# Patient Record
Sex: FEMALE | Race: OTHER | HISPANIC OR LATINO | URBAN - METROPOLITAN AREA
[De-identification: names, ages, dates, MRNs, and addresses within clinical notes are randomized per-mention and may not be internally consistent; named-entity substitution may affect disease eponyms.]

---

## 2017-08-03 ENCOUNTER — INPATIENT (INPATIENT)
Facility: HOSPITAL | Age: 42
LOS: 0 days | Discharge: HOME | DRG: 340 | End: 2017-08-04
Attending: SPECIALIST | Admitting: SPECIALIST
Payer: MEDICAID

## 2017-08-03 VITALS
SYSTOLIC BLOOD PRESSURE: 95 MMHG | OXYGEN SATURATION: 99 % | RESPIRATION RATE: 20 BRPM | HEART RATE: 51 BPM | WEIGHT: 149.03 LBS | TEMPERATURE: 98 F | DIASTOLIC BLOOD PRESSURE: 54 MMHG

## 2017-08-03 DIAGNOSIS — K37 UNSPECIFIED APPENDICITIS: ICD-10-CM

## 2017-08-03 DIAGNOSIS — K35.3 ACUTE APPENDICITIS WITH LOCALIZED PERITONITIS: ICD-10-CM

## 2017-08-03 DIAGNOSIS — Z98.51 TUBAL LIGATION STATUS: Chronic | ICD-10-CM

## 2017-08-03 LAB
ALBUMIN SERPL ELPH-MCNC: 3.5 G/DL — SIGNIFICANT CHANGE UP (ref 3.5–5)
ALP SERPL-CCNC: 66 U/L — SIGNIFICANT CHANGE UP (ref 40–120)
ALT FLD-CCNC: 21 U/L DA — SIGNIFICANT CHANGE UP (ref 10–60)
ANION GAP SERPL CALC-SCNC: 3 MMOL/L — LOW (ref 5–17)
APPEARANCE UR: CLEAR — SIGNIFICANT CHANGE UP
APTT BLD: 37.5 SEC — HIGH (ref 27.5–37.4)
AST SERPL-CCNC: 13 U/L — SIGNIFICANT CHANGE UP (ref 10–40)
BASOPHILS # BLD AUTO: 0.1 K/UL — SIGNIFICANT CHANGE UP (ref 0–0.2)
BASOPHILS NFR BLD AUTO: 1.2 % — SIGNIFICANT CHANGE UP (ref 0–2)
BILIRUB SERPL-MCNC: 0.2 MG/DL — SIGNIFICANT CHANGE UP (ref 0.2–1.2)
BILIRUB UR-MCNC: NEGATIVE — SIGNIFICANT CHANGE UP
BUN SERPL-MCNC: 10 MG/DL — SIGNIFICANT CHANGE UP (ref 7–18)
CALCIUM SERPL-MCNC: 8 MG/DL — LOW (ref 8.4–10.5)
CHLORIDE SERPL-SCNC: 109 MMOL/L — HIGH (ref 96–108)
CO2 SERPL-SCNC: 29 MMOL/L — SIGNIFICANT CHANGE UP (ref 22–31)
COLOR SPEC: YELLOW — SIGNIFICANT CHANGE UP
CREAT SERPL-MCNC: 0.66 MG/DL — SIGNIFICANT CHANGE UP (ref 0.5–1.3)
DIFF PNL FLD: ABNORMAL
EOSINOPHIL # BLD AUTO: 0.1 K/UL — SIGNIFICANT CHANGE UP (ref 0–0.5)
EOSINOPHIL NFR BLD AUTO: 1.7 % — SIGNIFICANT CHANGE UP (ref 0–6)
GLUCOSE SERPL-MCNC: 86 MG/DL — SIGNIFICANT CHANGE UP (ref 70–99)
GLUCOSE UR QL: NEGATIVE — SIGNIFICANT CHANGE UP
HCG UR QL: NEGATIVE — SIGNIFICANT CHANGE UP
HCT VFR BLD CALC: 34.8 % — SIGNIFICANT CHANGE UP (ref 34.5–45)
HGB BLD-MCNC: 11.7 G/DL — SIGNIFICANT CHANGE UP (ref 11.5–15.5)
INR BLD: 1.01 RATIO — SIGNIFICANT CHANGE UP (ref 0.88–1.16)
KETONES UR-MCNC: NEGATIVE — SIGNIFICANT CHANGE UP
LEUKOCYTE ESTERASE UR-ACNC: NEGATIVE — SIGNIFICANT CHANGE UP
LIDOCAIN IGE QN: 96 U/L — SIGNIFICANT CHANGE UP (ref 73–393)
LYMPHOCYTES # BLD AUTO: 2.3 K/UL — SIGNIFICANT CHANGE UP (ref 1–3.3)
LYMPHOCYTES # BLD AUTO: 37 % — SIGNIFICANT CHANGE UP (ref 13–44)
MCHC RBC-ENTMCNC: 30.5 PG — SIGNIFICANT CHANGE UP (ref 27–34)
MCHC RBC-ENTMCNC: 33.7 GM/DL — SIGNIFICANT CHANGE UP (ref 32–36)
MCV RBC AUTO: 90.4 FL — SIGNIFICANT CHANGE UP (ref 80–100)
MONOCYTES # BLD AUTO: 0.4 K/UL — SIGNIFICANT CHANGE UP (ref 0–0.9)
MONOCYTES NFR BLD AUTO: 6.2 % — SIGNIFICANT CHANGE UP (ref 2–14)
NEUTROPHILS # BLD AUTO: 3.4 K/UL — SIGNIFICANT CHANGE UP (ref 1.8–7.4)
NEUTROPHILS NFR BLD AUTO: 53.8 % — SIGNIFICANT CHANGE UP (ref 43–77)
NITRITE UR-MCNC: NEGATIVE — SIGNIFICANT CHANGE UP
PH UR: 7 — SIGNIFICANT CHANGE UP (ref 5–8)
PLATELET # BLD AUTO: 177 K/UL — SIGNIFICANT CHANGE UP (ref 150–400)
POTASSIUM SERPL-MCNC: 3.9 MMOL/L — SIGNIFICANT CHANGE UP (ref 3.5–5.3)
POTASSIUM SERPL-SCNC: 3.9 MMOL/L — SIGNIFICANT CHANGE UP (ref 3.5–5.3)
PROT SERPL-MCNC: 6.9 G/DL — SIGNIFICANT CHANGE UP (ref 6–8.3)
PROT UR-MCNC: NEGATIVE — SIGNIFICANT CHANGE UP
PROTHROM AB SERPL-ACNC: 11 SEC — SIGNIFICANT CHANGE UP (ref 9.8–12.7)
RBC # BLD: 3.85 M/UL — SIGNIFICANT CHANGE UP (ref 3.8–5.2)
RBC # FLD: 11.3 % — SIGNIFICANT CHANGE UP (ref 10.3–14.5)
SODIUM SERPL-SCNC: 141 MMOL/L — SIGNIFICANT CHANGE UP (ref 135–145)
SP GR SPEC: 1.01 — SIGNIFICANT CHANGE UP (ref 1.01–1.02)
UROBILINOGEN FLD QL: NEGATIVE — SIGNIFICANT CHANGE UP
WBC # BLD: 6.3 K/UL — SIGNIFICANT CHANGE UP (ref 3.8–10.5)
WBC # FLD AUTO: 6.3 K/UL — SIGNIFICANT CHANGE UP (ref 3.8–10.5)

## 2017-08-03 PROCEDURE — 99285 EMERGENCY DEPT VISIT HI MDM: CPT

## 2017-08-03 PROCEDURE — 44970 LAPAROSCOPY APPENDECTOMY: CPT | Mod: AS

## 2017-08-03 PROCEDURE — 88304 TISSUE EXAM BY PATHOLOGIST: CPT | Mod: 26

## 2017-08-03 PROCEDURE — 74177 CT ABD & PELVIS W/CONTRAST: CPT | Mod: 26

## 2017-08-03 RX ORDER — HEPARIN SODIUM 5000 [USP'U]/ML
5000 INJECTION INTRAVENOUS; SUBCUTANEOUS EVERY 8 HOURS
Qty: 0 | Refills: 0 | Status: DISCONTINUED | OUTPATIENT
Start: 2017-08-03 | End: 2017-08-04

## 2017-08-03 RX ORDER — SODIUM CHLORIDE 9 MG/ML
1000 INJECTION, SOLUTION INTRAVENOUS
Qty: 0 | Refills: 0 | Status: DISCONTINUED | OUTPATIENT
Start: 2017-08-03 | End: 2017-08-03

## 2017-08-03 RX ORDER — ONDANSETRON 8 MG/1
4 TABLET, FILM COATED ORAL ONCE
Qty: 0 | Refills: 0 | Status: DISCONTINUED | OUTPATIENT
Start: 2017-08-03 | End: 2017-08-03

## 2017-08-03 RX ORDER — MORPHINE SULFATE 50 MG/1
4 CAPSULE, EXTENDED RELEASE ORAL ONCE
Qty: 0 | Refills: 0 | Status: DISCONTINUED | OUTPATIENT
Start: 2017-08-03 | End: 2017-08-03

## 2017-08-03 RX ORDER — CEFOTETAN DISODIUM 1 G
2 VIAL (EA) INJECTION ONCE
Qty: 0 | Refills: 0 | Status: DISCONTINUED | OUTPATIENT
Start: 2017-08-03 | End: 2017-08-03

## 2017-08-03 RX ORDER — CEFOTETAN DISODIUM 1 G
VIAL (EA) INJECTION
Qty: 0 | Refills: 0 | Status: DISCONTINUED | OUTPATIENT
Start: 2017-08-03 | End: 2017-08-03

## 2017-08-03 RX ORDER — ONDANSETRON 8 MG/1
4 TABLET, FILM COATED ORAL EVERY 6 HOURS
Qty: 0 | Refills: 0 | Status: DISCONTINUED | OUTPATIENT
Start: 2017-08-03 | End: 2017-08-04

## 2017-08-03 RX ORDER — OXYCODONE AND ACETAMINOPHEN 5; 325 MG/1; MG/1
1 TABLET ORAL EVERY 4 HOURS
Qty: 0 | Refills: 0 | Status: DISCONTINUED | OUTPATIENT
Start: 2017-08-03 | End: 2017-08-04

## 2017-08-03 RX ORDER — HYDROMORPHONE HYDROCHLORIDE 2 MG/ML
0.5 INJECTION INTRAMUSCULAR; INTRAVENOUS; SUBCUTANEOUS
Qty: 0 | Refills: 0 | Status: DISCONTINUED | OUTPATIENT
Start: 2017-08-03 | End: 2017-08-03

## 2017-08-03 RX ORDER — MORPHINE SULFATE 50 MG/1
4 CAPSULE, EXTENDED RELEASE ORAL EVERY 4 HOURS
Qty: 0 | Refills: 0 | Status: DISCONTINUED | OUTPATIENT
Start: 2017-08-03 | End: 2017-08-04

## 2017-08-03 RX ORDER — MORPHINE SULFATE 50 MG/1
4 CAPSULE, EXTENDED RELEASE ORAL EVERY 6 HOURS
Qty: 0 | Refills: 0 | Status: DISCONTINUED | OUTPATIENT
Start: 2017-08-03 | End: 2017-08-03

## 2017-08-03 RX ADMIN — HYDROMORPHONE HYDROCHLORIDE 0.5 MILLIGRAM(S): 2 INJECTION INTRAMUSCULAR; INTRAVENOUS; SUBCUTANEOUS at 18:30

## 2017-08-03 RX ADMIN — ONDANSETRON 4 MILLIGRAM(S): 8 TABLET, FILM COATED ORAL at 19:49

## 2017-08-03 RX ADMIN — MORPHINE SULFATE 4 MILLIGRAM(S): 50 CAPSULE, EXTENDED RELEASE ORAL at 12:30

## 2017-08-03 RX ADMIN — HEPARIN SODIUM 5000 UNIT(S): 5000 INJECTION INTRAVENOUS; SUBCUTANEOUS at 21:22

## 2017-08-03 RX ADMIN — MORPHINE SULFATE 4 MILLIGRAM(S): 50 CAPSULE, EXTENDED RELEASE ORAL at 21:35

## 2017-08-03 RX ADMIN — MORPHINE SULFATE 4 MILLIGRAM(S): 50 CAPSULE, EXTENDED RELEASE ORAL at 12:06

## 2017-08-03 RX ADMIN — MORPHINE SULFATE 4 MILLIGRAM(S): 50 CAPSULE, EXTENDED RELEASE ORAL at 21:22

## 2017-08-03 RX ADMIN — HYDROMORPHONE HYDROCHLORIDE 0.5 MILLIGRAM(S): 2 INJECTION INTRAMUSCULAR; INTRAVENOUS; SUBCUTANEOUS at 18:00

## 2017-08-03 NOTE — ED PROVIDER NOTE - OBJECTIVE STATEMENT
42 y/o F pt w/ no significant PMHx presents to the ED c/o abd. pain radiating to back x2 days. Associates dysuria secondary to symptoms. Denies N/V/D fever, chills, hematuria or any other complaints. NKDA. LMP- Last week.

## 2017-08-03 NOTE — H&P ADULT - GASTROINTESTINAL DETAILS
no guarding/no organomegaly/no distention/no rigidity/bowel sounds normal/no masses palpable/no rebound tenderness/soft

## 2017-08-03 NOTE — H&P ADULT - HISTORY OF PRESENT ILLNESS
42 yo F with a PMHx of HLD not on any meds presents to the ED with RLQ x 1 week (likely from her menses). Pt came to the hospital today for a mammogram and decided to come to the ED for her RLQ pain since she was already here. Pt describes the pain as pressure that is 10/10 and radiates to the R flank and R lower back. Pt states the pain feels similar to her monthly menstrual cramps. Pt thought it was menstrual cramps, but her period ended last Thursday July 27th and the pain continued. Pt also complaining of dysuria, denies hematuria or fever. Pt denies any nausea, vomiting, changes in bowel habits, chills, CP, SOB, HA, dizziness. Pt denies any vaginal bleeding, and reports hx of tubal ligation. Pt's last meal was last night.

## 2017-08-03 NOTE — ED ADULT NURSE REASSESSMENT NOTE - NS ED NURSE REASSESS COMMENT FT1
Pt A0x3, sent to OR for procedure via stretcher, property sent to security, handoff given to RUTHY Goldstein.

## 2017-08-03 NOTE — ED ADULT NURSE NOTE - OBJECTIVE STATEMENT
Patient complains of abdominal pain, dysuria x3 days. Denies fever, chills, nausea, vomiting. Abdomen is soft, non tender, non distended. No guarding or facial grimacing noted. Moving all extremities.

## 2017-08-03 NOTE — PROGRESS NOTE ADULT - SUBJECTIVE AND OBJECTIVE BOX
Post Operative Day #: 0    SUBJECTIVE:41yFemale s/p lap appy; comfortable; no n/v, voided    Flatus:na            Bowel Movement: na    Pain Control Adequate: y    Nausea:  n         Vomiting: n    Diet: cayla      MEDICATIONS  (STANDING):  heparin  Injectable 5000 Unit(s) SubCutaneous every 8 hours    MEDICATIONS  (PRN):  ondansetron Injectable 4 milliGRAM(s) IV Push every 6 hours PRN Nausea and/or Vomiting  oxyCODONE    5 mG/acetaminophen 325 mG 1 Tablet(s) Oral every 4 hours PRN Moderate Pain  morphine  - Injectable 4 milliGRAM(s) IV Push every 4 hours PRN Severe Pain  ondansetron Injectable 4 milliGRAM(s) IV Push every 6 hours PRN Nausea      OBJECTIVE:  Vital Signs Last 24 Hrs  T(C): 36.6 (03 Aug 2017 21:05), Max: 36.6 (03 Aug 2017 15:47)  T(F): 97.8 (03 Aug 2017 21:05), Max: 97.8 (03 Aug 2017 15:47)  HR: 66 (03 Aug 2017 21:05) (51 - 66)  BP: 102/60 (03 Aug 2017 21:05) (95/54 - 133/80)  BP(mean): --  RR: 14 (03 Aug 2017 21:05) (12 - 20)  SpO2: 100% (03 Aug 2017 21:05) (96% - 100%)  I&O's Detail    03 Aug 2017 07:01  -  03 Aug 2017 22:17  --------------------------------------------------------  IN:    Lactated Ringers IV Bolus: 1100 mL  Total IN: 1100 mL    OUT:    Voided: 500 mL  Total OUT: 500 mL    Total NET: 600 mL                                11.7   6.3   )-----------( 177      ( 03 Aug 2017 12:26 )             34.8     03 Aug 2017 12:26    141    |  109    |  10     ----------------------------<  86     3.9     |  29     |  0.66     Ca    8.0        03 Aug 2017 12:26    TPro  6.9    /  Alb  3.5    /  TBili  0.2    /  DBili  x      /  AST  13     /  ALT  21     /  AlkPhos  66     03 Aug 2017 12:26    PT/INR - ( 03 Aug 2017 15:45 )   PT: 11.0 sec;   INR: 1.01 ratio         PTT - ( 03 Aug 2017 15:45 )  PTT:37.5 sec    Physical Exam:    Abdomen: soft, inc CDI, NT    Extremities: no c/c/e    stable post-op

## 2017-08-03 NOTE — ED PROVIDER NOTE - MEDICAL DECISION MAKING DETAILS
40 y/o F pt w/ 3 days of r sided abd. pain. On exam w/ RUQ tenderness. Will do labs, obtain urine, and CT to rule out appendicitis. 42 y/o F pt w/ 3 days of r sided abd. pain. On exam w/ RLQ tenderness. Will do labs, obtain urine, and CT to rule out appendicitis.

## 2017-08-03 NOTE — CHART NOTE - NSCHARTNOTEFT_GEN_A_CORE
Prior to surgery, patient was consented in her own language with help from [employer] services (ID# [id number]). The procedure was described to her, as was the anesthesia and necessity of the surgery. I also explained to her the benefits of the surgery and the risks, including bleeding, infection, intestinal leak at staple line, pneumonia, blood clots in lungs & legs, abdominal infection, MI & even death. She understood the risks and consented for appendectomy after all of her questions were answered.

## 2017-08-03 NOTE — H&P ADULT - PROBLEM SELECTOR PLAN 1
1. Admit to Surgery (Dr. Ferguson)  2. To OR for laparoscopic appendectomy today  3. Keep NPO  4. Start antibiotics  5. DVT PPx  6. Ambulation  7. Pain management  8. Vitals per routine

## 2017-08-03 NOTE — H&P ADULT - GIT ABD PE PAL DETAILS PC
tender/(+) McBurney's point tenderness, (+) Rovsing's, (+ ) Psoas sign, (-) Obdurator's, (-) Redding's sign, small infraumbilical scar from tubal ligation

## 2017-08-04 ENCOUNTER — TRANSCRIPTION ENCOUNTER (OUTPATIENT)
Age: 42
End: 2017-08-04

## 2017-08-04 VITALS
DIASTOLIC BLOOD PRESSURE: 57 MMHG | TEMPERATURE: 99 F | HEART RATE: 51 BPM | RESPIRATION RATE: 16 BRPM | SYSTOLIC BLOOD PRESSURE: 104 MMHG | OXYGEN SATURATION: 99 %

## 2017-08-04 DIAGNOSIS — G89.18 OTHER ACUTE POSTPROCEDURAL PAIN: ICD-10-CM

## 2017-08-04 DIAGNOSIS — Z90.49 ACQUIRED ABSENCE OF OTHER SPECIFIED PARTS OF DIGESTIVE TRACT: ICD-10-CM

## 2017-08-04 DIAGNOSIS — K37 UNSPECIFIED APPENDICITIS: ICD-10-CM

## 2017-08-04 LAB
ANION GAP SERPL CALC-SCNC: 5 MMOL/L — SIGNIFICANT CHANGE UP (ref 5–17)
BASOPHILS # BLD AUTO: 0 K/UL — SIGNIFICANT CHANGE UP (ref 0–0.2)
BASOPHILS NFR BLD AUTO: 0.4 % — SIGNIFICANT CHANGE UP (ref 0–2)
BUN SERPL-MCNC: 9 MG/DL — SIGNIFICANT CHANGE UP (ref 7–18)
CALCIUM SERPL-MCNC: 8.4 MG/DL — SIGNIFICANT CHANGE UP (ref 8.4–10.5)
CHLORIDE SERPL-SCNC: 106 MMOL/L — SIGNIFICANT CHANGE UP (ref 96–108)
CO2 SERPL-SCNC: 28 MMOL/L — SIGNIFICANT CHANGE UP (ref 22–31)
CREAT SERPL-MCNC: 0.77 MG/DL — SIGNIFICANT CHANGE UP (ref 0.5–1.3)
EOSINOPHIL # BLD AUTO: 0 K/UL — SIGNIFICANT CHANGE UP (ref 0–0.5)
EOSINOPHIL NFR BLD AUTO: 0.4 % — SIGNIFICANT CHANGE UP (ref 0–6)
GLUCOSE SERPL-MCNC: 104 MG/DL — HIGH (ref 70–99)
HCT VFR BLD CALC: 35.1 % — SIGNIFICANT CHANGE UP (ref 34.5–45)
HGB BLD-MCNC: 12.1 G/DL — SIGNIFICANT CHANGE UP (ref 11.5–15.5)
LYMPHOCYTES # BLD AUTO: 1.7 K/UL — SIGNIFICANT CHANGE UP (ref 1–3.3)
LYMPHOCYTES # BLD AUTO: 14.8 % — SIGNIFICANT CHANGE UP (ref 13–44)
MCHC RBC-ENTMCNC: 31.5 PG — SIGNIFICANT CHANGE UP (ref 27–34)
MCHC RBC-ENTMCNC: 34.5 GM/DL — SIGNIFICANT CHANGE UP (ref 32–36)
MCV RBC AUTO: 91.2 FL — SIGNIFICANT CHANGE UP (ref 80–100)
MONOCYTES # BLD AUTO: 0.6 K/UL — SIGNIFICANT CHANGE UP (ref 0–0.9)
MONOCYTES NFR BLD AUTO: 5.1 % — SIGNIFICANT CHANGE UP (ref 2–14)
NEUTROPHILS # BLD AUTO: 9.1 K/UL — HIGH (ref 1.8–7.4)
NEUTROPHILS NFR BLD AUTO: 79.4 % — HIGH (ref 43–77)
PLATELET # BLD AUTO: 174 K/UL — SIGNIFICANT CHANGE UP (ref 150–400)
POTASSIUM SERPL-MCNC: 3.8 MMOL/L — SIGNIFICANT CHANGE UP (ref 3.5–5.3)
POTASSIUM SERPL-SCNC: 3.8 MMOL/L — SIGNIFICANT CHANGE UP (ref 3.5–5.3)
RBC # BLD: 3.85 M/UL — SIGNIFICANT CHANGE UP (ref 3.8–5.2)
RBC # FLD: 11.1 % — SIGNIFICANT CHANGE UP (ref 10.3–14.5)
SODIUM SERPL-SCNC: 139 MMOL/L — SIGNIFICANT CHANGE UP (ref 135–145)
WBC # BLD: 11.4 K/UL — HIGH (ref 3.8–10.5)
WBC # FLD AUTO: 11.4 K/UL — HIGH (ref 3.8–10.5)

## 2017-08-04 PROCEDURE — 99232 SBSQ HOSP IP/OBS MODERATE 35: CPT

## 2017-08-04 PROCEDURE — 81025 URINE PREGNANCY TEST: CPT

## 2017-08-04 PROCEDURE — 86900 BLOOD TYPING SEROLOGIC ABO: CPT

## 2017-08-04 PROCEDURE — 86901 BLOOD TYPING SEROLOGIC RH(D): CPT

## 2017-08-04 PROCEDURE — 88304 TISSUE EXAM BY PATHOLOGIST: CPT

## 2017-08-04 PROCEDURE — 74177 CT ABD & PELVIS W/CONTRAST: CPT

## 2017-08-04 PROCEDURE — 80053 COMPREHEN METABOLIC PANEL: CPT

## 2017-08-04 PROCEDURE — 80048 BASIC METABOLIC PNL TOTAL CA: CPT

## 2017-08-04 PROCEDURE — 81001 URINALYSIS AUTO W/SCOPE: CPT

## 2017-08-04 PROCEDURE — 93005 ELECTROCARDIOGRAM TRACING: CPT

## 2017-08-04 PROCEDURE — 99285 EMERGENCY DEPT VISIT HI MDM: CPT | Mod: 25

## 2017-08-04 PROCEDURE — 85027 COMPLETE CBC AUTOMATED: CPT

## 2017-08-04 PROCEDURE — 86850 RBC ANTIBODY SCREEN: CPT

## 2017-08-04 PROCEDURE — 85610 PROTHROMBIN TIME: CPT

## 2017-08-04 PROCEDURE — 96374 THER/PROPH/DIAG INJ IV PUSH: CPT | Mod: XU

## 2017-08-04 PROCEDURE — 83690 ASSAY OF LIPASE: CPT

## 2017-08-04 PROCEDURE — 85730 THROMBOPLASTIN TIME PARTIAL: CPT

## 2017-08-04 RX ORDER — FLUCONAZOLE 150 MG/1
1 TABLET ORAL
Qty: 1 | Refills: 0 | OUTPATIENT
Start: 2017-08-04

## 2017-08-04 RX ADMIN — HEPARIN SODIUM 5000 UNIT(S): 5000 INJECTION INTRAVENOUS; SUBCUTANEOUS at 13:47

## 2017-08-04 RX ADMIN — OXYCODONE AND ACETAMINOPHEN 1 TABLET(S): 5; 325 TABLET ORAL at 09:49

## 2017-08-04 RX ADMIN — OXYCODONE AND ACETAMINOPHEN 1 TABLET(S): 5; 325 TABLET ORAL at 10:45

## 2017-08-04 RX ADMIN — ONDANSETRON 4 MILLIGRAM(S): 8 TABLET, FILM COATED ORAL at 13:46

## 2017-08-04 RX ADMIN — HEPARIN SODIUM 5000 UNIT(S): 5000 INJECTION INTRAVENOUS; SUBCUTANEOUS at 05:38

## 2017-08-04 NOTE — DISCHARGE NOTE ADULT - INSTRUCTIONS
keep the steristrips clean and dry; may wet during pat dry after shower, do not remove let it fall off by itself. follow up with surgeon

## 2017-08-04 NOTE — DISCHARGE NOTE ADULT - HOSPITAL COURSE
42 yo female admitted for acute appendicitis. Taken to the OR same day as admission, 8/3 for laparoscopic appendectomy. Pt did well postop, was advanced to a regular diet and was discharged POD#1

## 2017-08-04 NOTE — PROGRESS NOTE ADULT - SUBJECTIVE AND OBJECTIVE BOX
POD#1  41y Female with no overnight complaints. Tolerating reg diet. no nausea or vomitting    Vital Signs:  T(C): 37.1 (08-04-17 @ 05:49), Max: 37.1 (08-04-17 @ 05:49)  HR: 64 (08-04-17 @ 05:49) (51 - 66)  BP: 99/58 (08-04-17 @ 05:49) (95/54 - 133/80)  RR: 16 (08-04-17 @ 05:49) (12 - 20)  SpO2: 100% (08-04-17 @ 05:49) (96% - 100%)  Wt(kg): --    Physical Exam:  General: NAD, comfortable.  Abdomen: soft, NT/ND, all trochar sites C/D/I no active bleeding noted                          12.1   11.4  )-----------( 174      ( 04 Aug 2017 07:09 )             35.1

## 2017-08-04 NOTE — DISCHARGE NOTE ADULT - PATIENT PORTAL LINK FT
“You can access the FollowHealth Patient Portal, offered by [facility], by registering with the following website: [email or web address]”

## 2017-08-04 NOTE — DISCHARGE NOTE ADULT - CARE PLAN
Principal Discharge DX:	Appendicitis  Goal:	to tolerate a regular diet  Instructions for follow-up, activity and diet:	continue current diet. f/u with Dr Ferguson in 1-2 weeks. keep dressing dry x2 days then may remove and shower. leave white strips on skin until they fall off on their own

## 2017-08-04 NOTE — DISCHARGE NOTE ADULT - CARE PROVIDER_API CALL
Charlie Ferguson (MD), Surgery  [address]  [address]  [place] [address]  Phone: [phone]  Fax: [phone]

## 2017-08-04 NOTE — PROGRESS NOTE ADULT - SUBJECTIVE AND OBJECTIVE BOX
Chief Complaint: abdominal pain    HPI:   41y Female s/p lap appy, POD#1.  Pt complaining of back pain and abdominal pain.  No nausea or vomiting.  No chest pain or sob. Pt for discharge today.  Pt ambulating around unit.         PAIN SCORE:     4/10    SCALE USED: (1-10 VNRS)    Allergies    Motrin (Swelling)    Intolerances      MEDICATIONS  (STANDING):  heparin  Injectable 5000 Unit(s) SubCutaneous every 8 hours    MEDICATIONS  (PRN):  ondansetron Injectable 4 milliGRAM(s) IV Push every 6 hours PRN Nausea and/or Vomiting  oxyCODONE    5 mG/acetaminophen 325 mG 1 Tablet(s) Oral every 4 hours PRN Moderate Pain  morphine  - Injectable 4 milliGRAM(s) IV Push every 4 hours PRN Severe Pain  ondansetron Injectable 4 milliGRAM(s) IV Push every 6 hours PRN Nausea      PHYSICAL EXAM:    Vital Signs Last 24 Hrs  T(C): 37.1 (04 Aug 2017 05:49), Max: 37.1 (04 Aug 2017 05:49)  T(F): 98.7 (04 Aug 2017 05:49), Max: 98.7 (04 Aug 2017 05:49)  HR: 64 (04 Aug 2017 05:49) (54 - 66)  BP: 99/58 (04 Aug 2017 05:49) (99/58 - 133/80)  BP(mean): --  RR: 16 (04 Aug 2017 05:49) (12 - 19)  SpO2: 100% (04 Aug 2017 05:49) (96% - 100%)             LABS:                          12.1   11.4  )-----------( 174      ( 04 Aug 2017 07:09 )             35.1     -04    139  |  106  |  9   ----------------------------<  104<H>  3.8   |  28  |  0.77    Ca    8.4      04 Aug 2017 07:09    TPro  6.9  /  Alb  3.5  /  TBili  0.2  /  DBili  x   /  AST  13  /  ALT  21  /  AlkPhos  66  08-03    PT/INR - ( 03 Aug 2017 15:45 )   PT: 11.0 sec;   INR: 1.01 ratio         PTT - ( 03 Aug 2017 15:45 )  PTT:37.5 sec  Urinalysis Basic - ( 03 Aug 2017 12:25 )    Color: Yellow / Appearance: Clear / S.010 / pH: x  Gluc: x / Ketone: Negative  / Bili: Negative / Urobili: Negative   Blood: x / Protein: Negative / Nitrite: Negative   Leuk Esterase: Negative / RBC: 2-5 /HPF / WBC 0-2 /HPF   Sq Epi: x / Non Sq Epi: x / Bacteria: Trace /HPF        Drug Screen:        RADIOLOGY:

## 2017-08-04 NOTE — DISCHARGE NOTE ADULT - PLAN OF CARE
to tolerate a regular diet continue current diet. f/u with Dr Ferguson in 1-2 weeks. keep dressing dry x2 days then may remove and shower. leave white strips on skin until they fall off on their own

## 2017-08-07 DIAGNOSIS — K35.3 ACUTE APPENDICITIS WITH LOCALIZED PERITONITIS: ICD-10-CM

## 2017-08-07 DIAGNOSIS — E78.5 HYPERLIPIDEMIA, UNSPECIFIED: ICD-10-CM

## 2017-08-07 LAB — SURGICAL PATHOLOGY FINAL REPORT - CH: SIGNIFICANT CHANGE UP

## 2017-08-14 PROBLEM — Z00.00 ENCOUNTER FOR PREVENTIVE HEALTH EXAMINATION: Status: ACTIVE | Noted: 2017-08-14

## 2017-08-23 ENCOUNTER — APPOINTMENT (OUTPATIENT)
Dept: BARIATRICS | Facility: CLINIC | Age: 42
End: 2017-08-23

## 2017-08-29 ENCOUNTER — APPOINTMENT (OUTPATIENT)
Dept: SURGERY | Facility: CLINIC | Age: 42
End: 2017-08-29

## 2017-08-29 VITALS
TEMPERATURE: 98.6 F | HEART RATE: 54 BPM | DIASTOLIC BLOOD PRESSURE: 58 MMHG | OXYGEN SATURATION: 99 % | WEIGHT: 149 LBS | SYSTOLIC BLOOD PRESSURE: 92 MMHG

## 2017-08-29 VITALS — BODY MASS INDEX: 32.24 KG/M2 | HEIGHT: 57 IN

## 2017-08-29 DIAGNOSIS — A49.9 URINARY TRACT INFECTION, SITE NOT SPECIFIED: ICD-10-CM

## 2017-08-29 DIAGNOSIS — Z87.19 PERSONAL HISTORY OF OTHER DISEASES OF THE DIGESTIVE SYSTEM: ICD-10-CM

## 2017-08-29 DIAGNOSIS — N39.0 URINARY TRACT INFECTION, SITE NOT SPECIFIED: ICD-10-CM

## 2017-08-29 DIAGNOSIS — Z78.9 OTHER SPECIFIED HEALTH STATUS: ICD-10-CM

## 2017-08-29 DIAGNOSIS — Z86.39 PERSONAL HISTORY OF OTHER ENDOCRINE, NUTRITIONAL AND METABOLIC DISEASE: ICD-10-CM

## 2017-08-29 RX ORDER — SULFAMETHOXAZOLE AND TRIMETHOPRIM 800; 160 MG/1; MG/1
800-160 TABLET ORAL DAILY
Qty: 10 | Refills: 0 | Status: DISCONTINUED | COMMUNITY
Start: 2017-08-29 | End: 2017-08-29

## 2017-08-29 RX ORDER — SULFAMETHOXAZOLE AND TRIMETHOPRIM 800; 160 MG/1; MG/1
800-160 TABLET ORAL TWICE DAILY
Qty: 20 | Refills: 0 | Status: ACTIVE | COMMUNITY
Start: 2017-08-29 | End: 1900-01-01

## 2020-12-15 PROBLEM — N39.0 UTI (URINARY TRACT INFECTION), BACTERIAL: Status: RESOLVED | Noted: 2017-08-29 | Resolved: 2020-12-15

## 2021-02-21 ENCOUNTER — EMERGENCY (EMERGENCY)
Facility: HOSPITAL | Age: 46
LOS: 1 days | Discharge: HOME | End: 2021-02-21
Attending: EMERGENCY MEDICINE
Payer: MEDICAID

## 2021-02-21 VITALS
OXYGEN SATURATION: 98 % | TEMPERATURE: 99 F | WEIGHT: 163.14 LBS | HEART RATE: 57 BPM | SYSTOLIC BLOOD PRESSURE: 115 MMHG | RESPIRATION RATE: 18 BRPM | DIASTOLIC BLOOD PRESSURE: 64 MMHG

## 2021-02-21 VITALS
OXYGEN SATURATION: 100 % | RESPIRATION RATE: 17 BRPM | SYSTOLIC BLOOD PRESSURE: 121 MMHG | DIASTOLIC BLOOD PRESSURE: 60 MMHG | TEMPERATURE: 98 F | HEART RATE: 63 BPM

## 2021-02-21 DIAGNOSIS — Z98.51 TUBAL LIGATION STATUS: Chronic | ICD-10-CM

## 2021-02-21 LAB
ALBUMIN SERPL ELPH-MCNC: 3.3 G/DL — LOW (ref 3.5–5)
ALP SERPL-CCNC: 96 U/L — SIGNIFICANT CHANGE UP (ref 40–120)
ALT FLD-CCNC: 110 U/L DA — HIGH (ref 10–60)
ANION GAP SERPL CALC-SCNC: 4 MMOL/L — LOW (ref 5–17)
APPEARANCE UR: CLEAR — SIGNIFICANT CHANGE UP
AST SERPL-CCNC: 68 U/L — HIGH (ref 10–40)
BASOPHILS # BLD AUTO: 0.04 K/UL — SIGNIFICANT CHANGE UP (ref 0–0.2)
BASOPHILS NFR BLD AUTO: 0.7 % — SIGNIFICANT CHANGE UP (ref 0–2)
BILIRUB SERPL-MCNC: 0.3 MG/DL — SIGNIFICANT CHANGE UP (ref 0.2–1.2)
BILIRUB UR-MCNC: NEGATIVE — SIGNIFICANT CHANGE UP
BUN SERPL-MCNC: 13 MG/DL — SIGNIFICANT CHANGE UP (ref 7–18)
CALCIUM SERPL-MCNC: 8.3 MG/DL — LOW (ref 8.4–10.5)
CHLORIDE SERPL-SCNC: 106 MMOL/L — SIGNIFICANT CHANGE UP (ref 96–108)
CO2 SERPL-SCNC: 29 MMOL/L — SIGNIFICANT CHANGE UP (ref 22–31)
COLOR SPEC: YELLOW — SIGNIFICANT CHANGE UP
CREAT SERPL-MCNC: 0.68 MG/DL — SIGNIFICANT CHANGE UP (ref 0.5–1.3)
DIFF PNL FLD: ABNORMAL
EOSINOPHIL # BLD AUTO: 0.06 K/UL — SIGNIFICANT CHANGE UP (ref 0–0.5)
EOSINOPHIL NFR BLD AUTO: 1 % — SIGNIFICANT CHANGE UP (ref 0–6)
GLUCOSE SERPL-MCNC: 88 MG/DL — SIGNIFICANT CHANGE UP (ref 70–99)
GLUCOSE UR QL: NEGATIVE — SIGNIFICANT CHANGE UP
HCG UR QL: NEGATIVE — SIGNIFICANT CHANGE UP
HCT VFR BLD CALC: 35.7 % — SIGNIFICANT CHANGE UP (ref 34.5–45)
HGB BLD-MCNC: 12.2 G/DL — SIGNIFICANT CHANGE UP (ref 11.5–15.5)
IMM GRANULOCYTES NFR BLD AUTO: 0.8 % — SIGNIFICANT CHANGE UP (ref 0–1.5)
KETONES UR-MCNC: NEGATIVE — SIGNIFICANT CHANGE UP
LEUKOCYTE ESTERASE UR-ACNC: NEGATIVE — SIGNIFICANT CHANGE UP
LIDOCAIN IGE QN: 139 U/L — SIGNIFICANT CHANGE UP (ref 73–393)
LYMPHOCYTES # BLD AUTO: 1.85 K/UL — SIGNIFICANT CHANGE UP (ref 1–3.3)
LYMPHOCYTES # BLD AUTO: 31.3 % — SIGNIFICANT CHANGE UP (ref 13–44)
MCHC RBC-ENTMCNC: 30.9 PG — SIGNIFICANT CHANGE UP (ref 27–34)
MCHC RBC-ENTMCNC: 34.2 GM/DL — SIGNIFICANT CHANGE UP (ref 32–36)
MCV RBC AUTO: 90.4 FL — SIGNIFICANT CHANGE UP (ref 80–100)
MONOCYTES # BLD AUTO: 0.5 K/UL — SIGNIFICANT CHANGE UP (ref 0–0.9)
MONOCYTES NFR BLD AUTO: 8.5 % — SIGNIFICANT CHANGE UP (ref 2–14)
NEUTROPHILS # BLD AUTO: 3.41 K/UL — SIGNIFICANT CHANGE UP (ref 1.8–7.4)
NEUTROPHILS NFR BLD AUTO: 57.7 % — SIGNIFICANT CHANGE UP (ref 43–77)
NITRITE UR-MCNC: NEGATIVE — SIGNIFICANT CHANGE UP
NRBC # BLD: 0 /100 WBCS — SIGNIFICANT CHANGE UP (ref 0–0)
PH UR: 9 — HIGH (ref 5–8)
PLATELET # BLD AUTO: 227 K/UL — SIGNIFICANT CHANGE UP (ref 150–400)
POTASSIUM SERPL-MCNC: 3.8 MMOL/L — SIGNIFICANT CHANGE UP (ref 3.5–5.3)
POTASSIUM SERPL-SCNC: 3.8 MMOL/L — SIGNIFICANT CHANGE UP (ref 3.5–5.3)
PROT SERPL-MCNC: 6.8 G/DL — SIGNIFICANT CHANGE UP (ref 6–8.3)
PROT UR-MCNC: 500 MG/DL
RBC # BLD: 3.95 M/UL — SIGNIFICANT CHANGE UP (ref 3.8–5.2)
RBC # FLD: 12.7 % — SIGNIFICANT CHANGE UP (ref 10.3–14.5)
SODIUM SERPL-SCNC: 139 MMOL/L — SIGNIFICANT CHANGE UP (ref 135–145)
SP GR SPEC: 1.01 — SIGNIFICANT CHANGE UP (ref 1.01–1.02)
UROBILINOGEN FLD QL: NEGATIVE — SIGNIFICANT CHANGE UP
WBC # BLD: 5.91 K/UL — SIGNIFICANT CHANGE UP (ref 3.8–10.5)
WBC # FLD AUTO: 5.91 K/UL — SIGNIFICANT CHANGE UP (ref 3.8–10.5)

## 2021-02-21 PROCEDURE — 83690 ASSAY OF LIPASE: CPT

## 2021-02-21 PROCEDURE — 74176 CT ABD & PELVIS W/O CONTRAST: CPT | Mod: 26

## 2021-02-21 PROCEDURE — 80053 COMPREHEN METABOLIC PANEL: CPT

## 2021-02-21 PROCEDURE — 81025 URINE PREGNANCY TEST: CPT

## 2021-02-21 PROCEDURE — 87086 URINE CULTURE/COLONY COUNT: CPT

## 2021-02-21 PROCEDURE — 99284 EMERGENCY DEPT VISIT MOD MDM: CPT | Mod: 25

## 2021-02-21 PROCEDURE — 81001 URINALYSIS AUTO W/SCOPE: CPT

## 2021-02-21 PROCEDURE — 99284 EMERGENCY DEPT VISIT MOD MDM: CPT

## 2021-02-21 PROCEDURE — 36415 COLL VENOUS BLD VENIPUNCTURE: CPT

## 2021-02-21 PROCEDURE — 85025 COMPLETE CBC W/AUTO DIFF WBC: CPT

## 2021-02-21 PROCEDURE — 74176 CT ABD & PELVIS W/O CONTRAST: CPT

## 2021-02-21 RX ORDER — FAMOTIDINE 10 MG/ML
20 INJECTION INTRAVENOUS DAILY
Refills: 0 | Status: DISCONTINUED | OUTPATIENT
Start: 2021-02-21 | End: 2021-02-24

## 2021-02-21 RX ORDER — SUCRALFATE 1 G
1 TABLET ORAL
Qty: 20 | Refills: 0
Start: 2021-02-21

## 2021-02-21 RX ORDER — ACETAMINOPHEN 500 MG
975 TABLET ORAL ONCE
Refills: 0 | Status: COMPLETED | OUTPATIENT
Start: 2021-02-21 | End: 2021-02-21

## 2021-02-21 RX ORDER — OMEPRAZOLE 10 MG/1
1 CAPSULE, DELAYED RELEASE ORAL
Qty: 30 | Refills: 0
Start: 2021-02-21 | End: 2021-03-22

## 2021-02-21 RX ORDER — SUCRALFATE 1 G
1 TABLET ORAL ONCE
Refills: 0 | Status: COMPLETED | OUTPATIENT
Start: 2021-02-21 | End: 2021-02-21

## 2021-02-21 RX ORDER — OMEPRAZOLE 10 MG/1
1 CAPSULE, DELAYED RELEASE ORAL
Qty: 30 | Refills: 0
Start: 2021-02-21 | End: 2021-03-23

## 2021-02-21 RX ADMIN — FAMOTIDINE 20 MILLIGRAM(S): 10 INJECTION INTRAVENOUS at 14:50

## 2021-02-21 RX ADMIN — Medication 975 MILLIGRAM(S): at 13:52

## 2021-02-21 RX ADMIN — Medication 1 GRAM(S): at 14:50

## 2021-02-21 NOTE — ED PROVIDER NOTE - PATIENT PORTAL LINK FT
You can access the FollowMyHealth Patient Portal offered by [facility] by registering at the following website: [email or web address]. By joining [email or web address]’s FollowMyHealth portal, you will also be able to view your health information using other applications (apps) compatible with our system.

## 2021-02-21 NOTE — ED PROVIDER NOTE - PROGRESS NOTE DETAILS
feels much better. Shares that she has had some bloating and sometimes feels like her epigastric area burns, especially if she doesn't eat. Possible PUD/gastritis. Will dc on carafate, pmd referral, outpt follow up. discussed anticipatory guidance and return precautions.

## 2021-02-21 NOTE — ED PROVIDER NOTE - CARE PLAN
Principal Discharge DX:	Flank pain  Secondary Diagnosis:	Chronic gastritis without bleeding, unspecified gastritis type

## 2021-02-21 NOTE — ED PROVIDER NOTE - OBJECTIVE STATEMENT
45F with h/o tubal ligation, comes in with one week of intermittent flank pain, right sided, radiating to pelvic area. No fever or chills. No dysuria, hematuria. LMP 3 months ago. No fever, chills. No diarrhea. Has not taken any meds for pain. Allergic to ibuprofen. No vaginal dc, burning or itching. No rash. .

## 2021-02-22 LAB
CULTURE RESULTS: SIGNIFICANT CHANGE UP
SPECIMEN SOURCE: SIGNIFICANT CHANGE UP

## 2021-04-01 NOTE — H&P ADULT - NEGATIVE CARDIOVASCULAR SYMPTOMS
Sibling  Still living? Unknown  Family history of ovarian cyst, Age at diagnosis: Age Unknown  
no dyspnea on exertion/no chest pain

## 2024-08-27 NOTE — H&P ADULT - NEGATIVE RESPIRATORY AND THORAX SYMPTOMS
[FreeTextEntry1] : Mary Fournier is a 70-year-old woman with a history of hypertension and palpitations (attributed to PVCs & SVT), who returns for annual cardiac examination.  She has been feeling well since I last saw her and reports no new health concerns or problems.  She has a good baseline exercise tolerance and remains active.  Palpitations have been well-controlled with low-dose metoprolol.  She describes a healthy diet. no pleuritic chest pain/no dyspnea

## 2024-09-17 NOTE — ED PROVIDER NOTE - NORMAL, MLM
Informed by PCA, pt being hyper-verbal and defiant to staff and called the police on her cell phone. Pt stating she was overdosed by this RN and the insulin she was given contained more than insulin. Pt continues to ask \"what did I do to deserve this\" and \"what are you all doing to me\"? States people were in her room and talking about her grandchildren. Informed pt, medication provided was her prescribed medication, no one was currently in her room and no one is trying to overdose her. Pt states she wants no one in her room and \"if I could walk, I would walk out of here\". Telemetry removed and now refused.  Unable to identify triggering even at this time. Will continue to monitor and support.    Pt also pulled out IV from arm and began walking the melara with walker.    Daughter, Pamella Merrill, called per pt.    daniella all pertinent systems normal

## 2025-02-26 NOTE — PATIENT PROFILE ADULT. - HEALTHCARE INFORMATION NEEDED, PROFILE
[Normal] : no acute distress, well nourished, well developed and well-appearing [Coordination Grossly Intact] : coordination grossly intact [No Focal Deficits] : no focal deficits none

## 2025-08-02 NOTE — H&P ADULT - TENDERNESS
Problem: Sepsis/Septic Shock  Goal: Optimal Coping  Outcome: Progressing  Intervention: Support Patient and Family Response  Recent Flowsheet Documentation  Taken 8/2/2025 1000 by Lauryn Guadarrama RN  Family/Support System Care: self-care encouraged  Taken 8/2/2025 0800 by Lauryn Guadarrama RN  Supportive Measures:   active listening utilized   mindfulness techniques promoted   problem-solving facilitated   verbalization of feelings encouraged  Family/Support System Care: self-care encouraged  Goal: Absence of Bleeding  Outcome: Progressing  Goal: Blood Glucose Level Within Target Range  Outcome: Progressing  Goal: Absence of Infection Signs and Symptoms  Outcome: Progressing  Intervention: Initiate Sepsis Management  Recent Flowsheet Documentation  Taken 8/2/2025 1000 by Lauryn Guadarrama RN  Infection Prevention:   equipment surfaces disinfected   rest/sleep promoted   single patient room provided  Isolation Precautions: (neutropenic)   precautions maintained   other (see comments)  Taken 8/2/2025 0800 by Lauryn Guadarrama RN  Infection Prevention:   environmental surveillance performed   rest/sleep promoted   single patient room provided  Isolation Precautions: (neutropenic)   precautions maintained   other (see comments)  Intervention: Promote Recovery  Recent Flowsheet Documentation  Taken 8/2/2025 1000 by Lauryn Guadarrama RN  Activity Management:   ambulated to bathroom   activity encouraged  Taken 8/2/2025 0800 by Lauryn Guadarrama RN  Activity Management: activity encouraged  Goal: Optimal Nutrition Delivery  Outcome: Progressing     Problem: Adult Inpatient Plan of Care  Goal: Plan of Care Review  Outcome: Progressing  Flowsheets  Taken 8/2/2025 1146 by Lauryn Guadarrama RN  Outcome Evaluation:   patient to be discharged   patient leaving unit via wheelchair to personal car, mother to drive patient home   all belongings to go home with patient   IV removed per protocol, tip  intact   reviewed discharge information, AVS summary, and medication side effects with patient   patient stated understanding of discharge information, AVS summary, and medication side effects   patient resting comfortably at this time  Taken 8/1/2025 1553 by Patricia Gamez, PT  Plan of Care Reviewed With:   patient   parent  Goal: Patient-Specific Goal (Individualized)  Outcome: Progressing  Goal: Absence of Hospital-Acquired Illness or Injury  Outcome: Progressing  Intervention: Identify and Manage Fall Risk  Recent Flowsheet Documentation  Taken 8/2/2025 1000 by Lauryn Guadarrama RN  Safety Promotion/Fall Prevention:   activity supervised   assistive device/personal items within reach   clutter free environment maintained   elopement precautions   fall prevention program maintained   lighting adjusted   muscle strengthening facilitated   mobility aid in reach   nonskid shoes/slippers when out of bed   safety round/check completed   room organization consistent   toileting scheduled  Taken 8/2/2025 0800 by Lauryn Guadarrama RN  Safety Promotion/Fall Prevention:   activity supervised   assistive device/personal items within reach   clutter free environment maintained   elopement precautions   fall prevention program maintained   lighting adjusted   mobility aid in reach   muscle strengthening facilitated   nonskid shoes/slippers when out of bed   room organization consistent   safety round/check completed   toileting scheduled  Intervention: Prevent Skin Injury  Recent Flowsheet Documentation  Taken 8/2/2025 1000 by Lauryn Guadarrama RN  Body Position: position changed independently  Skin Protection:   drying agents applied   incontinence pads utilized   pulse oximeter probe site changed   transparent dressing maintained  Taken 8/2/2025 0800 by Lauryn Guadarrama RN  Body Position: position changed independently  Skin Protection:   drying agents applied   incontinence pads utilized   pulse oximeter  probe site changed   transparent dressing maintained  Intervention: Prevent and Manage VTE (Venous Thromboembolism) Risk  Recent Flowsheet Documentation  Taken 8/2/2025 1000 by Lauryn Guadarrama RN  VTE Prevention/Management: (eliquis) other (see comments)  Taken 8/2/2025 0800 by Lauryn Guadarrama RN  VTE Prevention/Management: (eliquis) other (see comments)  Intervention: Prevent Infection  Recent Flowsheet Documentation  Taken 8/2/2025 1000 by Lauryn Guadarrama RN  Infection Prevention:   equipment surfaces disinfected   rest/sleep promoted   single patient room provided  Taken 8/2/2025 0800 by Lauryn Guadarrama RN  Infection Prevention:   environmental surveillance performed   rest/sleep promoted   single patient room provided  Goal: Optimal Comfort and Wellbeing  Outcome: Progressing  Intervention: Monitor Pain and Promote Comfort  Recent Flowsheet Documentation  Taken 8/2/2025 1000 by Lauryn Guadarrama RN  Pain Management Interventions:   pillow support provided   position adjusted   quiet environment facilitated   relaxation techniques promoted  Taken 8/2/2025 0800 by Lauryn Gaudarrama RN  Pain Management Interventions:   pillow support provided   position adjusted  Intervention: Provide Person-Centered Care  Recent Flowsheet Documentation  Taken 8/2/2025 1000 by Lauryn Guadarrama RN  Trust Relationship/Rapport:   care explained   empathic listening provided   questions answered   thoughts/feelings acknowledged  Taken 8/2/2025 0800 by Lauryn Guadarrama RN  Trust Relationship/Rapport:   care explained   empathic listening provided   questions answered   thoughts/feelings acknowledged  Goal: Readiness for Transition of Care  Outcome: Progressing     Problem: Infection  Goal: Absence of Infection Signs and Symptoms  Outcome: Progressing  Intervention: Prevent or Manage Infection  Recent Flowsheet Documentation  Taken 8/2/2025 1000 by Lauryn Guadarrama RN  Isolation Precautions:  (neutropenic)   precautions maintained   other (see comments)  Taken 8/2/2025 0800 by Lauryn Guadarrama RN  Isolation Precautions: (neutropenic)   precautions maintained   other (see comments)     Problem: Pain Acute  Goal: Optimal Pain Control and Function  Outcome: Progressing  Intervention: Optimize Psychosocial Wellbeing  Recent Flowsheet Documentation  Taken 8/2/2025 1000 by Lauryn Guadarrama RN  Diversional Activities:   smartphone   television  Taken 8/2/2025 0800 by Lauryn Guadarrama RN  Supportive Measures:   active listening utilized   mindfulness techniques promoted   problem-solving facilitated   verbalization of feelings encouraged  Diversional Activities:   smartphone   television  Intervention: Develop Pain Management Plan  Recent Flowsheet Documentation  Taken 8/2/2025 1000 by Lauryn Guadarrama RN  Pain Management Interventions:   pillow support provided   position adjusted   quiet environment facilitated   relaxation techniques promoted  Taken 8/2/2025 0800 by Lauryn Guadarrama RN  Pain Management Interventions:   pillow support provided   position adjusted  Intervention: Prevent or Manage Pain  Recent Flowsheet Documentation  Taken 8/2/2025 1000 by Lauryn Guadarrama RN  Medication Review/Management: medications reviewed  Taken 8/2/2025 0800 by Lauryn Guadarrama RN  Sensory Stimulation Regulation:   auditory stimulation minimized   care clustered   lighting decreased   quiet environment promoted   television on   visual stimulation minimized  Medication Review/Management: medications reviewed     Problem: Nausea and Vomiting  Goal: Nausea and Vomiting Relief  Outcome: Progressing  Intervention: Prevent and Manage Nausea and Vomiting  Recent Flowsheet Documentation  Taken 8/2/2025 0800 by Lauryn Guadarrama RN  Environmental Support:   calm environment promoted   environmental consistency promoted   rest periods encouraged     Problem: Fall Injury Risk  Goal: Absence of  Fall and Fall-Related Injury  Outcome: Progressing  Intervention: Identify and Manage Contributors  Recent Flowsheet Documentation  Taken 8/2/2025 1000 by Lauryn Guadarrama RN  Medication Review/Management: medications reviewed  Taken 8/2/2025 0800 by Lauryn Guadarrama RN  Medication Review/Management: medications reviewed  Intervention: Promote Injury-Free Environment  Recent Flowsheet Documentation  Taken 8/2/2025 1000 by Lauryn Guadarrama RN  Safety Promotion/Fall Prevention:   activity supervised   assistive device/personal items within reach   clutter free environment maintained   elopement precautions   fall prevention program maintained   lighting adjusted   muscle strengthening facilitated   mobility aid in reach   nonskid shoes/slippers when out of bed   safety round/check completed   room organization consistent   toileting scheduled  Taken 8/2/2025 0800 by Lauryn Guadarrama RN  Safety Promotion/Fall Prevention:   activity supervised   assistive device/personal items within reach   clutter free environment maintained   elopement precautions   fall prevention program maintained   lighting adjusted   mobility aid in reach   muscle strengthening facilitated   nonskid shoes/slippers when out of bed   room organization consistent   safety round/check completed   toileting scheduled   Goal Outcome Evaluation:           Progress: improving  Outcome Evaluation: patient to be discharged; patient leaving unit via wheelchair to personal car, mother to drive patient home; all belongings to go home with patient; IV removed per protocol, tip intact; reviewed discharge information, AVS summary, and medication side effects with patient; patient stated understanding of discharge information, AVS summary, and medication side effects; patient resting comfortably at this time                              RLQ